# Patient Record
Sex: MALE | Race: WHITE
[De-identification: names, ages, dates, MRNs, and addresses within clinical notes are randomized per-mention and may not be internally consistent; named-entity substitution may affect disease eponyms.]

---

## 2017-05-21 ENCOUNTER — HOSPITAL ENCOUNTER (EMERGENCY)
Dept: HOSPITAL 47 - EC | Age: 22
Discharge: HOME | End: 2017-05-21
Payer: COMMERCIAL

## 2017-05-21 VITALS — TEMPERATURE: 97.7 F | RESPIRATION RATE: 16 BRPM

## 2017-05-21 VITALS — SYSTOLIC BLOOD PRESSURE: 104 MMHG | HEART RATE: 91 BPM | DIASTOLIC BLOOD PRESSURE: 56 MMHG

## 2017-05-21 DIAGNOSIS — R11.2: Primary | ICD-10-CM

## 2017-05-21 DIAGNOSIS — R10.9: ICD-10-CM

## 2017-05-21 LAB
ALP SERPL-CCNC: 49 U/L (ref 38–126)
ALT SERPL-CCNC: 47 U/L (ref 21–72)
AMYLASE SERPL-CCNC: 61 U/L (ref 30–110)
ANION GAP SERPL CALC-SCNC: 18 MMOL/L
AST SERPL-CCNC: 30 U/L (ref 17–59)
BASOPHILS # BLD AUTO: 0.1 K/UL (ref 0–0.2)
BASOPHILS NFR BLD AUTO: 1 %
BUN SERPL-SCNC: 27 MG/DL (ref 9–20)
CALCIUM SPEC-MCNC: 10.1 MG/DL (ref 8.4–10.2)
CH: 31.2
CHCM: 35.2
CHLORIDE SERPL-SCNC: 104 MMOL/L (ref 98–107)
CO2 SERPL-SCNC: 22 MMOL/L (ref 22–30)
EOSINOPHIL # BLD AUTO: 0.1 K/UL (ref 0–0.7)
EOSINOPHIL NFR BLD AUTO: 1 %
ERYTHROCYTE [DISTWIDTH] IN BLOOD BY AUTOMATED COUNT: 5.85 M/UL (ref 4.3–5.9)
ERYTHROCYTE [DISTWIDTH] IN BLOOD: 12.3 % (ref 11.5–15.5)
GLUCOSE SERPL-MCNC: 73 MG/DL (ref 74–99)
HCT VFR BLD AUTO: 52 % (ref 39–53)
HDW: 2.57
HGB BLD-MCNC: 17.5 GM/DL (ref 13–17.5)
LUC NFR BLD AUTO: 1 %
LYMPHOCYTES # SPEC AUTO: 1.5 K/UL (ref 1–4.8)
LYMPHOCYTES NFR SPEC AUTO: 12 %
MCH RBC QN AUTO: 29.9 PG (ref 25–35)
MCHC RBC AUTO-ENTMCNC: 33.6 G/DL (ref 31–37)
MCV RBC AUTO: 88.9 FL (ref 80–100)
MONOCYTES # BLD AUTO: 0.7 K/UL (ref 0–1)
MONOCYTES NFR BLD AUTO: 6 %
NEUTROPHILS # BLD AUTO: 9.9 K/UL (ref 1.3–7.7)
NEUTROPHILS NFR BLD AUTO: 80 %
NON-AFRICAN AMERICAN GFR(MDRD): >60
POTASSIUM SERPL-SCNC: 4.5 MMOL/L (ref 3.5–5.1)
PROT SERPL-MCNC: 7.9 G/DL (ref 6.3–8.2)
SODIUM SERPL-SCNC: 144 MMOL/L (ref 137–145)
WBC # BLD AUTO: 0.17 10*3/UL
WBC # BLD AUTO: 12.5 K/UL (ref 3.8–10.6)
WBC (PEROX): 12.49

## 2017-05-21 PROCEDURE — 83690 ASSAY OF LIPASE: CPT

## 2017-05-21 PROCEDURE — 96374 THER/PROPH/DIAG INJ IV PUSH: CPT

## 2017-05-21 PROCEDURE — 82150 ASSAY OF AMYLASE: CPT

## 2017-05-21 PROCEDURE — 99284 EMERGENCY DEPT VISIT MOD MDM: CPT

## 2017-05-21 PROCEDURE — 96375 TX/PRO/DX INJ NEW DRUG ADDON: CPT

## 2017-05-21 PROCEDURE — 96361 HYDRATE IV INFUSION ADD-ON: CPT

## 2017-05-21 PROCEDURE — 85025 COMPLETE CBC W/AUTO DIFF WBC: CPT

## 2017-05-21 PROCEDURE — 80053 COMPREHEN METABOLIC PANEL: CPT

## 2017-05-21 PROCEDURE — 74020: CPT

## 2017-05-21 PROCEDURE — 36415 COLL VENOUS BLD VENIPUNCTURE: CPT

## 2017-05-21 NOTE — ED
Abdominal Pain HPI





- General


Chief Complaint: Abdominal Pain


Stated Complaint: Vomiting


Time Seen by Provider: 05/21/17 15:40


Source: patient, RN notes reviewed


Mode of arrival: ambulatory


Limitations: no limitations





- History of Present Illness


Initial Comments: 





21-year-old male presents to the emergency Department chief complaint of nausea 

and vomiting.  Patient states she's been having nausea and vomiting 5:00 in the 

morning.  Patient states that it having hiccups he vomits.  Patient states that 

he is having abdominal pain with this.  Patient states it history acid reflux 

but states that this normally goes away but is not going away today.  Patient 

denies any fever chills he denies any abdominal pain changes in bowel or 

bladder habits.  Patient states she had the sharp.  It is not patient water but 

is unable to keep any of it down.  Patient states she was concerned due to his 

continued symptoms so he thought that he should be evaluated.Patient denies any 

recent fever, chills, shortness of breath, chest pain, back pain, abdominal pain

, numbness or tingling, dysuria or hematuria, constipation or diarrhea, 

headaches or visual changes, or any other current symptoms.





- Related Data


 Home Medications











 Medication  Instructions  Recorded  Confirmed


 


Bismuth Subsalicylate 524 mg PO DAILY PRN 05/21/17 05/21/17





[Pepto-Bismol]   








 Previous Rx's











 Medication  Instructions  Recorded


 


Ondansetron Odt [Zofran ODT] 4 mg PO Q8HR PRN #20 tab 05/21/17











 Allergies











Allergy/AdvReac Type Severity Reaction Status Date / Time


 


No Known Allergies Allergy   Verified 05/21/17 15:43














Review of Systems


ROS Statement: 


Those systems with pertinent positive or pertinent negative responses have been 

documented in the HPI.





ROS Other: All systems not noted in ROS Statement are negative.





Past Medical History


Past Medical History: GERD/Reflux


History of Any Multi-Drug Resistant Organisms: MRSA


Date of last positivie culture/infection: 2009


Past Surgical History: Orthopedic Surgery, Tonsillectomy


Additional Past Surgical History / Comment(s): nose


Past Psychological History: ADD/ADHD


Smoking Status: Never smoker


Past Alcohol Use History: Occasional


Past Drug Use History: None Reported





General Exam





- General Exam Comments


Initial Comments: 





General:  The patient is awake and alert, in no distress, and does not appear 

acutely ill. 


Eye:  Pupils are equal, round.


Ears, nose, mouth and throat:  There are moist mucous membranes.


Neck:  The neck is supple, there is no tenderness.


Cardiovascular:  There is a regular rate and rhythm. No murmur, rub or gallop 

is appreciated.


Respiratory:  Lungs are clear to auscultation, respirations are non-labored, 

breath sounds are equal.  No wheezes, stridor, rales, or rhonchi.


Gastrointestinal:  Soft, non-distended, non-tender abdomen without masses or 

organomegaly noted. There is no rebound or guarding present.  No CVA 

tenderness. Bowel sounds are unremarkable.


Back:  There is no tenderness to palpation in the midline. There is no obvious 

deformity. No rashes noted. 


Musculoskeletal:  Normal ROM, no tenderness, There is no pedal edema. There is 

no calf tenderness or swelling. Sensation intact. Pulses equal bilaterally 2+.  


Neurological:  CN II-XII intact, There are no obvious motor or sensory 

deficits. Coordination appears grossly intact. Speech is normal.


Skin:  Skin is warm and dry and no rashes or lesions are noted. 


Psychiatric:  Cooperative, appropriate mood & affect, normal judgment.  





Limitations: no limitations





Course


 Vital Signs











  05/21/17





  15:13


 


Temperature 97.7 F


 


Pulse Rate 98


 


Respiratory 16





Rate 


 


Blood Pressure 127/76


 


O2 Sat by Pulse 96





Oximetry 














Medical Decision Making





- Medical Decision Making





21-year-old male presents to the emergency department with a chief complaint of 

nausea and vomiting.  At this time patient's nausea vomiting has improved.  We 

will continue the patient's IV fluids and give him a bolus.  We'll give her 

nausea meds for home.  We discussed care follow-up return parameters all patient

's questions.  He stated he understood his plan.  He will be discharged home.





- Lab Data


Result diagrams: 


 05/21/17 16:00





 05/21/17 16:00


 Lab Results











  05/21/17 05/21/17 Range/Units





  16:00 16:00 


 


WBC   12.5 H  (3.8-10.6)  k/uL


 


RBC   5.85  (4.30-5.90)  m/uL


 


Hgb   17.5  (13.0-17.5)  gm/dL


 


Hct   52.0  (39.0-53.0)  %


 


MCV   88.9  (80.0-100.0)  fL


 


MCH   29.9  (25.0-35.0)  pg


 


MCHC   33.6  (31.0-37.0)  g/dL


 


RDW   12.3  (11.5-15.5)  %


 


Plt Count   260  (150-450)  k/uL


 


Neutrophils %   80  %


 


Lymphocytes %   12  %


 


Monocytes %   6  %


 


Eosinophils %   1  %


 


Basophils %   1  %


 


Neutrophils #   9.9 H  (1.3-7.7)  k/uL


 


Lymphocytes #   1.5  (1.0-4.8)  k/uL


 


Monocytes #   0.7  (0-1.0)  k/uL


 


Eosinophils #   0.1  (0-0.7)  k/uL


 


Basophils #   0.1  (0-0.2)  k/uL


 


Sodium  144   (137-145)  mmol/L


 


Potassium  4.5   (3.5-5.1)  mmol/L


 


Chloride  104   ()  mmol/L


 


Carbon Dioxide  22   (22-30)  mmol/L


 


Anion Gap  18   mmol/L


 


BUN  27 H   (9-20)  mg/dL


 


Creatinine  0.96   (0.66-1.25)  mg/dL


 


Est GFR (MDRD) Af Amer  >60   (>60 ml/min/1.73 sqM)  


 


Est GFR (MDRD) Non-Af  >60   (>60 ml/min/1.73 sqM)  


 


Glucose  73 L   (74-99)  mg/dL


 


Calcium  10.1   (8.4-10.2)  mg/dL


 


Total Bilirubin  2.3 H   (0.2-1.3)  mg/dL


 


AST  30   (17-59)  U/L


 


ALT  47   (21-72)  U/L


 


Alkaline Phosphatase  49   ()  U/L


 


Total Protein  7.9   (6.3-8.2)  g/dL


 


Albumin  5.1 H   (3.5-5.0)  g/dL


 


Amylase  61   ()  U/L


 


Lipase  28   ()  U/L














- Radiology Data


Radiology results: report reviewed, image reviewed





Disposition


Clinical Impression: 


 Nausea & vomiting





Disposition: HOME SELF-CARE


Condition: Stable


Instructions:  Acute Nausea and Vomiting (ED)


Additional Instructions: 


Please use medication as discussed. Please follow up with family doctor if 

symptoms have not improved over the next two days. Please return to the 

emergency room if your symptoms increase or worsen or for any other concerns. 


Prescriptions: 


Ondansetron Odt [Zofran ODT] 4 mg PO Q8HR PRN #20 tab


 PRN Reason: Nausea


Referrals: 


Keyonna Cali MD [STAFF PHYSICIAN] - 1-2 days


Time of Disposition: 16:48

## 2017-05-21 NOTE — XR
EXAMINATION TYPE: XR abdomen 2V

 

DATE OF EXAM: 5/21/2017 4:11 PM

 

COMPARISON: NONE

 

INDICATION: Pain

 

TECHNIQUE: Abdomen in the upright and supine views.

 

FINDINGS:  

There is a normal bowel gas pattern. No free air is present. No differential air-fluid levels are pre
sent.

Psoas margins are normal.

No organomegaly is present.

Normal colonic bowel gas is present. Some fecal debris is within the colon. No suspicious calcificati
ons are evident.

 

IMPRESSION: 

1. Unremarkable Abdomen

## 2021-09-24 ENCOUNTER — HOSPITAL ENCOUNTER (INPATIENT)
Dept: HOSPITAL 47 - EC | Age: 26
LOS: 1 days | Discharge: HOME | DRG: 381 | End: 2021-09-25
Attending: INTERNAL MEDICINE | Admitting: INTERNAL MEDICINE
Payer: MEDICARE

## 2021-09-24 ENCOUNTER — HOSPITAL ENCOUNTER (EMERGENCY)
Dept: HOSPITAL 47 - EC | Age: 26
Discharge: HOME | End: 2021-09-24
Payer: MEDICARE

## 2021-09-24 VITALS — RESPIRATION RATE: 18 BRPM | HEART RATE: 77 BPM | TEMPERATURE: 98 F

## 2021-09-24 VITALS — SYSTOLIC BLOOD PRESSURE: 143 MMHG | DIASTOLIC BLOOD PRESSURE: 80 MMHG

## 2021-09-24 DIAGNOSIS — Z20.822: ICD-10-CM

## 2021-09-24 DIAGNOSIS — Z79.899: ICD-10-CM

## 2021-09-24 DIAGNOSIS — K21.9: Primary | ICD-10-CM

## 2021-09-24 DIAGNOSIS — R17: ICD-10-CM

## 2021-09-24 DIAGNOSIS — K21.00: ICD-10-CM

## 2021-09-24 DIAGNOSIS — K44.9: ICD-10-CM

## 2021-09-24 DIAGNOSIS — D72.829: ICD-10-CM

## 2021-09-24 DIAGNOSIS — R11.2: ICD-10-CM

## 2021-09-24 DIAGNOSIS — K22.11: Primary | ICD-10-CM

## 2021-09-24 DIAGNOSIS — Z86.14: ICD-10-CM

## 2021-09-24 DIAGNOSIS — F90.9: ICD-10-CM

## 2021-09-24 LAB
ALBUMIN SERPL-MCNC: 5.1 G/DL (ref 3.5–5)
ALBUMIN SERPL-MCNC: 5.4 G/DL (ref 3.5–5)
ALP SERPL-CCNC: 53 U/L (ref 38–126)
ALP SERPL-CCNC: 54 U/L (ref 38–126)
ALT SERPL-CCNC: 19 U/L (ref 4–49)
ALT SERPL-CCNC: 20 U/L (ref 4–49)
AMYLASE SERPL-CCNC: 47 U/L (ref 30–110)
ANION GAP SERPL CALC-SCNC: 15 MMOL/L
ANION GAP SERPL CALC-SCNC: 18 MMOL/L
APTT BLD: 22.1 SEC (ref 22–30)
AST SERPL-CCNC: 25 U/L (ref 17–59)
AST SERPL-CCNC: 27 U/L (ref 17–59)
BASOPHILS # BLD AUTO: 0 K/UL (ref 0–0.2)
BASOPHILS # BLD AUTO: 0.1 K/UL (ref 0–0.2)
BASOPHILS NFR BLD AUTO: 0 %
BASOPHILS NFR BLD AUTO: 1 %
BUN SERPL-SCNC: 25 MG/DL (ref 9–20)
BUN SERPL-SCNC: 27 MG/DL (ref 9–20)
CALCIUM SPEC-MCNC: 10.3 MG/DL (ref 8.4–10.2)
CALCIUM SPEC-MCNC: 10.7 MG/DL (ref 8.4–10.2)
CHLORIDE SERPL-SCNC: 101 MMOL/L (ref 98–107)
CHLORIDE SERPL-SCNC: 103 MMOL/L (ref 98–107)
CO2 SERPL-SCNC: 21 MMOL/L (ref 22–30)
CO2 SERPL-SCNC: 23 MMOL/L (ref 22–30)
EOSINOPHIL # BLD AUTO: 0 K/UL (ref 0–0.7)
EOSINOPHIL # BLD AUTO: 0.1 K/UL (ref 0–0.7)
EOSINOPHIL NFR BLD AUTO: 0 %
EOSINOPHIL NFR BLD AUTO: 1 %
ERYTHROCYTE [DISTWIDTH] IN BLOOD BY AUTOMATED COUNT: 5.71 M/UL (ref 4.3–5.9)
ERYTHROCYTE [DISTWIDTH] IN BLOOD BY AUTOMATED COUNT: 5.73 M/UL (ref 4.3–5.9)
ERYTHROCYTE [DISTWIDTH] IN BLOOD: 11.8 % (ref 11.5–15.5)
ERYTHROCYTE [DISTWIDTH] IN BLOOD: 12.4 % (ref 11.5–15.5)
GLUCOSE SERPL-MCNC: 122 MG/DL (ref 74–99)
GLUCOSE SERPL-MCNC: 90 MG/DL (ref 74–99)
HCT VFR BLD AUTO: 49.9 % (ref 39–53)
HCT VFR BLD AUTO: 51.1 % (ref 39–53)
HGB BLD-MCNC: 17.6 GM/DL (ref 13–17.5)
HGB BLD-MCNC: 17.9 GM/DL (ref 13–17.5)
INR PPP: 1.1 (ref ?–1.2)
KETONES UR QL STRIP.AUTO: (no result)
LIPASE SERPL-CCNC: 26 U/L (ref 23–300)
LIPASE SERPL-CCNC: 28 U/L (ref 23–300)
LYMPHOCYTES # SPEC AUTO: 1.2 K/UL (ref 1–4.8)
LYMPHOCYTES # SPEC AUTO: 1.5 K/UL (ref 1–4.8)
LYMPHOCYTES NFR SPEC AUTO: 12 %
LYMPHOCYTES NFR SPEC AUTO: 7 %
MCH RBC QN AUTO: 30.7 PG (ref 25–35)
MCH RBC QN AUTO: 31.3 PG (ref 25–35)
MCHC RBC AUTO-ENTMCNC: 35.1 G/DL (ref 31–37)
MCHC RBC AUTO-ENTMCNC: 35.2 G/DL (ref 31–37)
MCV RBC AUTO: 87.3 FL (ref 80–100)
MCV RBC AUTO: 89.2 FL (ref 80–100)
MONOCYTES # BLD AUTO: 0.5 K/UL (ref 0–1)
MONOCYTES # BLD AUTO: 0.7 K/UL (ref 0–1)
MONOCYTES NFR BLD AUTO: 4 %
MONOCYTES NFR BLD AUTO: 4 %
NEUTROPHILS # BLD AUTO: 15.8 K/UL (ref 1.3–7.7)
NEUTROPHILS # BLD AUTO: 9.8 K/UL (ref 1.3–7.7)
NEUTROPHILS NFR BLD AUTO: 81 %
NEUTROPHILS NFR BLD AUTO: 89 %
PH UR: 5.5 [PH] (ref 5–8)
PLATELET # BLD AUTO: 287 K/UL (ref 150–450)
PLATELET # BLD AUTO: 299 K/UL (ref 150–450)
POTASSIUM SERPL-SCNC: 4.3 MMOL/L (ref 3.5–5.1)
POTASSIUM SERPL-SCNC: 4.3 MMOL/L (ref 3.5–5.1)
PROT SERPL-MCNC: 7.8 G/DL (ref 6.3–8.2)
PROT SERPL-MCNC: 8.6 G/DL (ref 6.3–8.2)
PT BLD: 11.6 SEC (ref 9–12)
SODIUM SERPL-SCNC: 140 MMOL/L (ref 137–145)
SODIUM SERPL-SCNC: 141 MMOL/L (ref 137–145)
SP GR UR: 1.03 (ref 1–1.03)
UROBILINOGEN UR QL STRIP: <2 MG/DL (ref ?–2)
WBC # BLD AUTO: 12.1 K/UL (ref 3.8–10.6)
WBC # BLD AUTO: 17.9 K/UL (ref 3.8–10.6)

## 2021-09-24 PROCEDURE — 83735 ASSAY OF MAGNESIUM: CPT

## 2021-09-24 PROCEDURE — 96374 THER/PROPH/DIAG INJ IV PUSH: CPT

## 2021-09-24 PROCEDURE — 86901 BLOOD TYPING SEROLOGIC RH(D): CPT

## 2021-09-24 PROCEDURE — 43235 EGD DIAGNOSTIC BRUSH WASH: CPT

## 2021-09-24 PROCEDURE — 81003 URINALYSIS AUTO W/O SCOPE: CPT

## 2021-09-24 PROCEDURE — 85025 COMPLETE CBC W/AUTO DIFF WBC: CPT

## 2021-09-24 PROCEDURE — 82150 ASSAY OF AMYLASE: CPT

## 2021-09-24 PROCEDURE — 99285 EMERGENCY DEPT VISIT HI MDM: CPT

## 2021-09-24 PROCEDURE — 86850 RBC ANTIBODY SCREEN: CPT

## 2021-09-24 PROCEDURE — 83690 ASSAY OF LIPASE: CPT

## 2021-09-24 PROCEDURE — 96375 TX/PRO/DX INJ NEW DRUG ADDON: CPT

## 2021-09-24 PROCEDURE — 80306 DRUG TEST PRSMV INSTRMNT: CPT

## 2021-09-24 PROCEDURE — 36415 COLL VENOUS BLD VENIPUNCTURE: CPT

## 2021-09-24 PROCEDURE — 80048 BASIC METABOLIC PNL TOTAL CA: CPT

## 2021-09-24 PROCEDURE — 85610 PROTHROMBIN TIME: CPT

## 2021-09-24 PROCEDURE — 85730 THROMBOPLASTIN TIME PARTIAL: CPT

## 2021-09-24 PROCEDURE — 0DJ08ZZ INSPECTION OF UPPER INTESTINAL TRACT, VIA NATURAL OR ARTIFICIAL OPENING ENDOSCOPIC: ICD-10-PCS

## 2021-09-24 PROCEDURE — 80076 HEPATIC FUNCTION PANEL: CPT

## 2021-09-24 PROCEDURE — 99284 EMERGENCY DEPT VISIT MOD MDM: CPT

## 2021-09-24 PROCEDURE — 93005 ELECTROCARDIOGRAM TRACING: CPT

## 2021-09-24 PROCEDURE — 84484 ASSAY OF TROPONIN QUANT: CPT

## 2021-09-24 PROCEDURE — 83605 ASSAY OF LACTIC ACID: CPT

## 2021-09-24 PROCEDURE — 96361 HYDRATE IV INFUSION ADD-ON: CPT

## 2021-09-24 PROCEDURE — 87635 SARS-COV-2 COVID-19 AMP PRB: CPT

## 2021-09-24 PROCEDURE — 86900 BLOOD TYPING SEROLOGIC ABO: CPT

## 2021-09-24 PROCEDURE — 80053 COMPREHEN METABOLIC PANEL: CPT

## 2021-09-24 PROCEDURE — 71046 X-RAY EXAM CHEST 2 VIEWS: CPT

## 2021-09-24 RX ADMIN — HYDROMORPHONE HYDROCHLORIDE PRN MG: 1 INJECTION, SOLUTION INTRAMUSCULAR; INTRAVENOUS; SUBCUTANEOUS at 15:14

## 2021-09-24 RX ADMIN — CEFAZOLIN SCH MLS: 330 INJECTION, POWDER, FOR SOLUTION INTRAMUSCULAR; INTRAVENOUS at 16:41

## 2021-09-24 RX ADMIN — HYDROMORPHONE HYDROCHLORIDE PRN MG: 1 INJECTION, SOLUTION INTRAMUSCULAR; INTRAVENOUS; SUBCUTANEOUS at 17:36

## 2021-09-24 RX ADMIN — PANTOPRAZOLE SODIUM SCH MG: 40 INJECTION, POWDER, FOR SOLUTION INTRAVENOUS at 21:21

## 2021-09-24 RX ADMIN — CEFAZOLIN SCH MLS/HR: 330 INJECTION, POWDER, FOR SOLUTION INTRAMUSCULAR; INTRAVENOUS at 17:37

## 2021-09-24 NOTE — ED
Abdominal Pain HPI





- General


Chief Complaint: Abdominal Pain


Stated Complaint: acid reflux


Time Seen by Provider: 09/24/21 08:29


Source: patient, RN notes reviewed


Mode of arrival: ambulatory


Limitations: no limitations





- History of Present Illness


Initial Comments: 





Patient is a 26 she'll male that presents to emergency department complaining of

acid reflux with nausea and vomiting.  He notes that he is having a hard time 

keeping the water at this time.  He notes that he eats spicy food drinks 

CAFFEINE.  He notes that the aggravating incident this time was be just do this 

morning.  He notices he ate the meat from us to he had instant heartburn.  P

atient denied taking any [place] medication at home.  He usually just eats Tums 

but did not get him in time.  He denied any other issues or complaints.  He was 

otherwise a well-appearing 26 she'll male.  He denied chest pain shortness of 

breath constipation diarrhea fever fatigue chills.





- Related Data


                                  Previous Rx's











 Medication  Instructions  Recorded


 


Ondansetron Odt [Zofran Odt] 4 mg PO Q8HR PRN #10 tab 09/24/21


 


Pantoprazole [Protonix] 40 mg PO DAILY 10 Days #10 tab 09/24/21











                                    Allergies











Allergy/AdvReac Type Severity Reaction Status Date / Time


 


No Known Allergies Allergy   Verified 09/24/21 09:55














Review of Systems


ROS Statement: 


Those systems with pertinent positive or pertinent negative responses have been 

documented in the HPI.





ROS Other: All systems not noted in ROS Statement are negative.





Past Medical History


Past Medical History: GERD/Reflux


History of Any Multi-Drug Resistant Organisms: MRSA


Date of last positivie culture/infection: 2009


Past Surgical History: Orthopedic Surgery, Tonsillectomy


Additional Past Surgical History / Comment(s): nose


Past Psychological History: ADD/ADHD


Smoking Status: Never smoker


Past Alcohol Use History: Occasional


Past Drug Use History: Marijuana





General Exam


Limitations: no limitations


General appearance: alert, in no apparent distress


Head exam: Present: atraumatic, normocephalic, normal inspection


Eye exam: Present: normal appearance, PERRL, EOMI.  Absent: scleral icterus, 

conjunctival injection, periorbital swelling


ENT exam: Present: normal exam, mucous membranes moist


Neck exam: Present: normal inspection


Respiratory exam: Present: normal lung sounds bilaterally.  Absent: respiratory 

distress, wheezes, rales, rhonchi, stridor


Cardiovascular Exam: Present: regular rate, normal rhythm, normal heart sounds. 

 Absent: systolic murmur, diastolic murmur, rubs, gallop, clicks


GI/Abdominal exam: Present: soft, normal bowel sounds.  Absent: distended, 

tenderness, guarding, rebound, rigid


Extremities exam: Present: normal inspection, full ROM, normal capillary refill.

  Absent: tenderness, pedal edema, joint swelling, calf tenderness


Neurological exam: Present: alert, oriented X3


Psychiatric exam: Present: normal affect, normal mood


Skin exam: Present: warm, dry, intact, normal color.  Absent: rash





Course


                                   Vital Signs











  09/24/21 09/24/21 09/24/21





  08:24 09:27 10:27


 


Temperature 98 F  


 


Pulse Rate 77  


 


Respiratory 18 18 18





Rate   


 


Blood Pressure 121/81  


 


O2 Sat by Pulse 100  





Oximetry   














Medical Decision Making





- Medical Decision Making





26 she'll male complaining of nausea and vomiting associated with acid reflux.


Basic labs, 40 mg of Protonix, GI cocktail, 4 g of Zofran ordered.


Patient unable to keep down GI cocktail, Zofran given will retry.


Labs: Mild leukocytosis at 12.5 most likely reactive from vomiting.  CMP 

unremarkable.


Case discussed with Dr. Vivar, patient discharge home with follow up primary 

care.





- Lab Data


Result diagrams: 


                                 09/24/21 08:45





                                 09/24/21 08:45


                                   Lab Results











  09/24/21 09/24/21 Range/Units





  08:45 08:45 


 


WBC  12.1 H   (3.8-10.6)  k/uL


 


RBC  5.71   (4.30-5.90)  m/uL


 


Hgb  17.6 H   (13.0-17.5)  gm/dL


 


Hct  49.9   (39.0-53.0)  %


 


MCV  87.3   (80.0-100.0)  fL


 


MCH  30.7   (25.0-35.0)  pg


 


MCHC  35.2   (31.0-37.0)  g/dL


 


RDW  12.4   (11.5-15.5)  %


 


Plt Count  299   (150-450)  k/uL


 


MPV  7.8   


 


Neutrophils %  81   %


 


Lymphocytes %  12   %


 


Monocytes %  4   %


 


Eosinophils %  1   %


 


Basophils %  1   %


 


Neutrophils #  9.8 H   (1.3-7.7)  k/uL


 


Lymphocytes #  1.5   (1.0-4.8)  k/uL


 


Monocytes #  0.5   (0-1.0)  k/uL


 


Eosinophils #  0.1   (0-0.7)  k/uL


 


Basophils #  0.1   (0-0.2)  k/uL


 


Sodium   141  (137-145)  mmol/L


 


Potassium   4.3  (3.5-5.1)  mmol/L


 


Chloride   103  ()  mmol/L


 


Carbon Dioxide   23  (22-30)  mmol/L


 


Anion Gap   15  mmol/L


 


BUN   25 H  (9-20)  mg/dL


 


Creatinine   0.95  (0.66-1.25)  mg/dL


 


Est GFR (CKD-EPI)AfAm   >90  (>60 ml/min/1.73 sqM)  


 


Est GFR (CKD-EPI)NonAf   >90  (>60 ml/min/1.73 sqM)  


 


Glucose   90  (74-99)  mg/dL


 


Calcium   10.3 H  (8.4-10.2)  mg/dL


 


Total Bilirubin   2.6 H  (0.2-1.3)  mg/dL


 


AST   25  (17-59)  U/L


 


ALT   19  (4-49)  U/L


 


Alkaline Phosphatase   53  ()  U/L


 


Total Protein   7.8  (6.3-8.2)  g/dL


 


Albumin   5.1 H  (3.5-5.0)  g/dL


 


Amylase   47  ()  U/L


 


Lipase   28  ()  U/L














Disposition


Clinical Impression: 


 Gastroesophageal reflux, Nausea & vomiting





Disposition: HOME SELF-CARE


Condition: Stable


Instructions (If sedation given, give patient instructions):  Gastroesophageal 

Reflux Disease (ED)


Additional Instructions: 


Please return to the Emergency Department if symptoms worsen or any other 

concerns.


Follow-up with primary care 1-2 days.


Take medication as prescribed.


Eat a bland diet.





Prescriptions: 


Pantoprazole [Protonix] 40 mg PO DAILY 10 Days #10 tab


Ondansetron Odt [Zofran Odt] 4 mg PO Q8HR PRN #10 tab


 PRN Reason: Nausea


Is patient prescribed a controlled substance at d/c from ED?: No


Referrals: 


None,Stated [Primary Care Provider] - 1-2 days


Time of Disposition: 10:40

## 2021-09-24 NOTE — P.CONS
History of Present Illness





- Reason for Consult


Consult date: 09/24/21


Hematemesis


Requesting physician: Madalyn Hamilton





- Chief Complaint


Epigastric pain, hematemesis





- History of Present Illness





This is a 26-year-old white male who presented to the emergency department 

earlier this morning with complaints of acid reflux with nausea and vomiting he 

was given GI cocktail and sent home.  He returned this afternoon with complaints

of continued epigastric pain with nausea, vomiting and hematemesis.  States he 

started vomiting bright red blood at home as well as here in the emergency 

department.  He states he does have a history of acid reflux since high school 

with frequent nausea and vomiting.  He does not take any medications for his ac

id reflux and has not seen a PCP or specialist for the chronic nausea and 

vomiting.  He denies any previous history of peptic ulcer disease or previous 

EGD or colonoscopy.  He denies any alcohol abuse, he is a nonsmoker, however he 

does smoke marijuana regularly.  He has a past medical history of anxiety, 

depression, and ADHD for which he is not on any medications currently.  The 

patient states he's been vomiting pretty much all day for the last 3 days 

duration since Wednesday.  However this the first episode of vomiting blood.  On

presentation WBC 17.9 hemoglobin 17.9 hematocrit 51 platelet count 287,000 INR 

1.1 sodium 140 potassium 4.3 BUN 27 creatinine 1.0 glucose 122 plasma lactic 

acid 2.7 total bilirubin 3.0 AST 27 ALT 20 alkaline phosphatase 54 lipase 26.  

Patient states he has had nothing to eat or drink today, states he actually 

vomited the GI cocktails that they gave to him in the emergency department.





Review of Systems





REVIEW OF SYSTEMS:


CARDIOPULMONARY: No chest pain or shortness of breath.  


Gastrointestinal: Acid reflux with severe epigastric pain.  Nausea and vomiting 

3 days.  Multiple episodes.  Hematemesis.  No rectal bleeding, or melena.


GENITOURINARY:  No dysuria or hematuria. 


MUSCULOSKELETAL: Reports normal range of motion.,  Joint pain.


SKIN: No rashes.  No jaundice.


ENDOCRINE: No chills, fevers.  No excessive weight gain or loss.  No polydipsia 

or polyuria.


PSYCHIATRIC: Unremarkable. 


NEUROLOGY: No change in mental status.  Denies dizziness, headache.


ENT: Vision unremarkable. 


CONSTITUTIONAL: No recent weight loss. No fever, chills, night sweats. 





Past Medical History


Past Medical History: GERD/Reflux


History of Any Multi-Drug Resistant Organisms: MRSA


Year Discovered:: 2009


Past Surgical History: Orthopedic Surgery, Tonsillectomy


Additional Past Surgical History / Comment(s): nose


Past Psychological History: ADD/ADHD


Smoking Status: Never smoker


Past Alcohol Use History: Occasional


Past Drug Use History: Marijuana





Medications and Allergies


                                Home Medications











 Medication  Instructions  Recorded  Confirmed  Type


 


Ondansetron Odt [Zofran Odt] 4 mg PO Q8HR PRN #10 tab 09/24/21  Rx


 


Pantoprazole [Protonix] 40 mg PO DAILY 10 Days #10 tab 09/24/21  Rx








                                    Allergies











Allergy/AdvReac Type Severity Reaction Status Date / Time


 


No Known Allergies Allergy   Verified 09/24/21 14:58














Physical Exam


Vitals: 


                                   Vital Signs











  Temp Pulse Resp BP Pulse Ox


 


 09/24/21 14:31   84  16  136/79  99


 


 09/24/21 13:03  98.0 F  68  20  131/77  97








                                Intake and Output











 09/23/21 09/24/21 09/24/21





 22:59 06:59 14:59


 


Other:   


 


  Weight   65.771 kg














General appearance: The patient is alert, oriented, appears in no acute 

distress.


HET: Head is normocephalic and atraumatic.  Conjunctiva pink.  Sclera anicteric.


Neck: Supple without lymphadenopathy.  Trachea midline.


Heart: S1 S2.  Regular rate and rhythm.


Lungs: Clear to auscultation.


Abdomen: Soft, epigastric tenderness, nondistended with  bowel sounds.  No 

guarding or rigidity.


Skin:  No rashes. No jaundice.


Extremities: Normal skin color and turgor.  No pedal edema.


Neurological: No focal deficits.  Alert and oriented 3..





Results


CBC & Chem 7: 


                                 09/24/21 13:45





                                 09/24/21 13:45


Labs: 


                  Abnormal Lab Results - Last 24 Hours (Table)











  09/24/21 09/24/21 09/24/21 Range/Units





  13:45 13:45 13:45 


 


WBC  17.9 H    (3.8-10.6)  k/uL


 


Hgb  17.9 H    (13.0-17.5)  gm/dL


 


Neutrophils #  15.8 H    (1.3-7.7)  k/uL


 


Carbon Dioxide   21 L   (22-30)  mmol/L


 


BUN   27 H   (9-20)  mg/dL


 


Glucose   122 H   (74-99)  mg/dL


 


Plasma Lactic Acid Aidan    2.7 H*  (0.7-2.0)  mmol/L


 


Calcium   10.7 H   (8.4-10.2)  mg/dL


 


Total Bilirubin   3.0 H   (0.2-1.3)  mg/dL


 


Total Protein   8.6 H   (6.3-8.2)  g/dL


 


Albumin   5.4 H   (3.5-5.0)  g/dL














Assessment and Plan


(1) Hematemesis


Narrative/Plan: 


26-year-old male with a past medical history of GERD, anxiety, depression, ADHD 

and marijuana use currently not on any medications presented to the emergency 

department for the second time today with complaints of acid reflux with nausea 

and vomiting.  He was seen earlier today and given a GI cocktail and sent home. 

While he was at home he continued to have nausea and vomiting and eventually 

started vomiting bright red blood.  Patient states he has severe epigastric pain

associated with the nausea and vomiting.  He's had symptoms since high school 

with nausea and vomiting and acid reflux however has not seen a PCP or 

specialist and is currently not on any medications.  Patient denies any history 

of alcohol abuse or previous peptic ulcer disease.  He has not had a previous GI

bleed, he denies any EGD or colonoscopy in the past.  Possible etiologies 

include peptic ulcer disease, gastritis, esophagitis, Kandy-Llamas tear or 

other etiologies.  Will proceed with EGD.


Current Visit: Yes   Status: Acute   Code(s): K92.0 - HEMATEMESIS   SNOMED 

Code(s): 8409865


   





(2) Nausea & vomiting


Narrative/Plan: 


Patient with history of cyclic nausea and vomiting, this may be related to 

marijuana use.


Current Visit: Yes   Status: Acute   Code(s): R11.2 - NAUSEA WITH VOMITING, 

UNSPECIFIED   SNOMED Code(s): 32127874


   





(3) Epigastric pain


Current Visit: Yes   Status: Acute   Code(s): R10.13 - EPIGASTRIC PAIN   SNOMED 

Code(s): 38265091


   





(4) Gastroesophageal reflux


Current Visit: Yes   Status: Acute   Code(s): K21.9 - GASTRO-ESOPHAGEAL REFLUX 

DISEASE WITHOUT ESOPHAGITIS   SNOMED Code(s): 507993612


   


Plan: 





1.  Keep nothing by mouth


2.  Protonix 40 mg twice a day


3.  Antiemetics as needed


4.  Repeat CBC in the morning, transfuse for hemoglobin less than 7


5.  Patient scheduled for EGD this afternoon, procedure discussed in detail with

patient and his mother who is at the bedside including risks and benefits.  

Patient is willing to proceed


6.  Marijuana abstinence





Thank you for allowing us to participate in the care of the patient, the GI 

service will sign off, gastroenterology will not be available at the hospital 

this weekend and through next week.  If further evaluation by gastroenterology 

is required the patient will need transfer as per the primary team's discretion.







Dr. ERIN Walsh


I agree with the dictator's note, documented as a scribe by Kourtney KUMAR.

## 2021-09-24 NOTE — XR
EXAMINATION TYPE: XR chest 2V

 

DATE OF EXAM: 9/24/2021

 

COMPARISON: NONE

 

HISTORY: Hematemesis

 

TECHNIQUE:  Frontal and lateral views of the chest are obtained.

 

FINDINGS:  There is no focal air space opacity, pleural effusion, or pneumothorax seen.  The cardiac 
silhouette size is within normal limits.   The osseous structures are intact.

 

IMPRESSION:  No acute cardiopulmonary process.

## 2021-09-24 NOTE — ED
Abdominal Pain HPI





- General


Chief Complaint: Abdominal Pain


Stated Complaint: Revisit/Vomiting Blood


Source: patient


Mode of arrival: ambulatory


Limitations: no limitations





- History of Present Illness


Initial Comments: 





Patient is a 26-year-old previously healthy male who presents emergency room 

with nausea, vomiting, epigastric pain with vomiting of bright red blood.  

Patient was seen in emergency room for similar complaints earlier this morning. 

He reports that he frequently has reflux.  He will have globus sensations with 

inability to swallow.  He states he will take Tums and the symptoms will 

improve.  This is the first time he had a come to the emergency room for his 

complaint.  He was seen earlier.  Laboratory studies were completed.  He 

received a GI cocktail and Protonix.  He states he felt much better was 

discharged home.  She reports that as soon as he got home he began having 

hematemesis.  Denies history of similar in the past.  No NSAID or alcohol use.  

No fevers or chills.  He denies hemoptysis.  Patient on any blood thinners.  No 

history of clotting disorders.  Patient has never had an EGD.  No other 

alleviating, precipitating or modifying factors





- Related Data


                                  Previous Rx's











 Medication  Instructions  Recorded


 


Ondansetron Odt [Zofran Odt] 4 mg PO Q8HR PRN #10 tab 09/24/21


 


Pantoprazole [Protonix] 40 mg PO DAILY 10 Days #10 tab 09/24/21











                                    Allergies











Allergy/AdvReac Type Severity Reaction Status Date / Time


 


No Known Allergies Allergy   Verified 09/24/21 14:58














Review of Systems


ROS Statement: 


Those systems with pertinent positive or pertinent negative responses have been 

documented in the HPI.





ROS Other: All systems not noted in ROS Statement are negative.





Past Medical History


Past Medical History: GERD/Reflux


History of Any Multi-Drug Resistant Organisms: MRSA


Date of last positivie culture/infection: 2009


Past Surgical History: Orthopedic Surgery, Tonsillectomy


Additional Past Surgical History / Comment(s): nose


Past Psychological History: ADD/ADHD


Smoking Status: Never smoker


Past Alcohol Use History: Occasional


Past Drug Use History: Marijuana





General Exam


Limitations: no limitations


General appearance: alert, in no apparent distress


Head exam: Present: atraumatic, normocephalic, normal inspection


Eye exam: Present: normal appearance, PERRL, EOMI.  Absent: scleral icterus, 

conjunctival injection, periorbital swelling


ENT exam: Present: normal exam, mucous membranes moist


Neck exam: Present: normal inspection.  Absent: tenderness, meningismus, 

lymphadenopathy


Respiratory exam: Present: normal lung sounds bilaterally.  Absent: respiratory 

distress, wheezes, rales, rhonchi, stridor


Cardiovascular Exam: Present: regular rate, normal rhythm, normal heart sounds. 

 Absent: systolic murmur, diastolic murmur, rubs, gallop, clicks


GI/Abdominal exam: Present: soft, tenderness (epigastric), normal bowel sounds. 

 Absent: distended, guarding, rebound, rigid


Extremities exam: Present: normal inspection, full ROM, normal capillary refill.

  Absent: tenderness, pedal edema, joint swelling, calf tenderness


Back exam: Present: normal inspection


Neurological exam: Present: alert, oriented X3, CN II-XII intact


Psychiatric exam: Present: normal affect, normal mood


Skin exam: Present: warm, dry, intact, normal color.  Absent: rash





Course


                                   Vital Signs











  09/24/21 09/24/21





  13:03 14:31


 


Temperature 98.0 F 


 


Pulse Rate 68 84


 


Respiratory 20 16





Rate  


 


Blood Pressure 131/77 136/79


 


O2 Sat by Pulse 97 99





Oximetry  














- Reevaluation(s)


Reevaluation #1: 





09/24/21 14:24


Spoke with Dr. Walsh - patient going for EGD





Medical Decision Making





- Medical Decision Making





Arrival patient's placed into room 23.  There are history of physical exam is 

performed.  IV is established.  He was previously given 40 mg of Protonix.  He 

is given an additional 40 mg.  He is complaining of epigastric pain.  Given 4 mg

 of Zofran and 4 mg of morphine.  Laboratory studies are repeated.  Hemoglobin 

stable at 17.9.  Lactic acid 2.7.  Patient was given a 2 L bolus of normal 

saline.  Patient continues to have vomiting of bright red blood.  I did order 10

 g of Reglan.  Called and spoke with Dr. Walsh.  Dr. Walsh's nurse practitioner 

does present to the emergency department and is scheduling patient for 

endoscopy.  He will be admitted to [facility].  Spoke with Dr. Burleson who agreed to 

that the patient.  He currently remains nothing by mouth awaiting to go for EGD





- Lab Data


Result diagrams: 


                                 09/24/21 13:45





                                 09/24/21 13:45


                                   Lab Results











  09/24/21 09/24/21 09/24/21 Range/Units





  13:45 13:45 13:45 


 


WBC  17.9 H    (3.8-10.6)  k/uL


 


RBC  5.73    (4.30-5.90)  m/uL


 


Hgb  17.9 H    (13.0-17.5)  gm/dL


 


Hct  51.1    (39.0-53.0)  %


 


MCV  89.2    (80.0-100.0)  fL


 


MCH  31.3    (25.0-35.0)  pg


 


MCHC  35.1    (31.0-37.0)  g/dL


 


RDW  11.8    (11.5-15.5)  %


 


Plt Count  287    (150-450)  k/uL


 


MPV  7.8    


 


Neutrophils %  89    %


 


Lymphocytes %  7    %


 


Monocytes %  4    %


 


Eosinophils %  0    %


 


Basophils %  0    %


 


Neutrophils #  15.8 H    (1.3-7.7)  k/uL


 


Lymphocytes #  1.2    (1.0-4.8)  k/uL


 


Monocytes #  0.7    (0-1.0)  k/uL


 


Eosinophils #  0.0    (0-0.7)  k/uL


 


Basophils #  0.0    (0-0.2)  k/uL


 


PT     (9.0-12.0)  sec


 


INR     (<1.2)  


 


APTT     (22.0-30.0)  sec


 


Sodium   140   (137-145)  mmol/L


 


Potassium   4.3   (3.5-5.1)  mmol/L


 


Chloride   101   ()  mmol/L


 


Carbon Dioxide   21 L   (22-30)  mmol/L


 


Anion Gap   18   mmol/L


 


BUN   27 H   (9-20)  mg/dL


 


Creatinine   1.00   (0.66-1.25)  mg/dL


 


Est GFR (CKD-EPI)AfAm   >90   (>60 ml/min/1.73 sqM)  


 


Est GFR (CKD-EPI)NonAf   >90   (>60 ml/min/1.73 sqM)  


 


Glucose   122 H   (74-99)  mg/dL


 


Lactic Ac Sepsis Rflx     


 


Plasma Lactic Acid Aidan    2.7 H*  (0.7-2.0)  mmol/L


 


Calcium   10.7 H   (8.4-10.2)  mg/dL


 


Total Bilirubin   3.0 H   (0.2-1.3)  mg/dL


 


AST   27   (17-59)  U/L


 


ALT   20   (4-49)  U/L


 


Alkaline Phosphatase   54   ()  U/L


 


Troponin I     (0.000-0.034)  ng/mL


 


Total Protein   8.6 H   (6.3-8.2)  g/dL


 


Albumin   5.4 H   (3.5-5.0)  g/dL


 


Lipase   26   ()  U/L


 


Blood Type     


 


Blood Type Confirm     


 


Blood Type Recheck     


 


Bld Type Recheck Status     


 


Antibody Screen     


 


Spec Expiration Date     














  09/24/21 09/24/21 09/24/21 Range/Units





  13:45 13:45 14:25 


 


WBC     (3.8-10.6)  k/uL


 


RBC     (4.30-5.90)  m/uL


 


Hgb     (13.0-17.5)  gm/dL


 


Hct     (39.0-53.0)  %


 


MCV     (80.0-100.0)  fL


 


MCH     (25.0-35.0)  pg


 


MCHC     (31.0-37.0)  g/dL


 


RDW     (11.5-15.5)  %


 


Plt Count     (150-450)  k/uL


 


MPV     


 


Neutrophils %     %


 


Lymphocytes %     %


 


Monocytes %     %


 


Eosinophils %     %


 


Basophils %     %


 


Neutrophils #     (1.3-7.7)  k/uL


 


Lymphocytes #     (1.0-4.8)  k/uL


 


Monocytes #     (0-1.0)  k/uL


 


Eosinophils #     (0-0.7)  k/uL


 


Basophils #     (0-0.2)  k/uL


 


PT   11.6   (9.0-12.0)  sec


 


INR   1.1   (<1.2)  


 


APTT   22.1   (22.0-30.0)  sec


 


Sodium     (137-145)  mmol/L


 


Potassium     (3.5-5.1)  mmol/L


 


Chloride     ()  mmol/L


 


Carbon Dioxide     (22-30)  mmol/L


 


Anion Gap     mmol/L


 


BUN     (9-20)  mg/dL


 


Creatinine     (0.66-1.25)  mg/dL


 


Est GFR (CKD-EPI)AfAm     (>60 ml/min/1.73 sqM)  


 


Est GFR (CKD-EPI)NonAf     (>60 ml/min/1.73 sqM)  


 


Glucose     (74-99)  mg/dL


 


Lactic Ac Sepsis Rflx    Y  


 


Plasma Lactic Acid Aidan     (0.7-2.0)  mmol/L


 


Calcium     (8.4-10.2)  mg/dL


 


Total Bilirubin     (0.2-1.3)  mg/dL


 


AST     (17-59)  U/L


 


ALT     (4-49)  U/L


 


Alkaline Phosphatase     ()  U/L


 


Troponin I     (0.000-0.034)  ng/mL


 


Total Protein     (6.3-8.2)  g/dL


 


Albumin     (3.5-5.0)  g/dL


 


Lipase     ()  U/L


 


Blood Type  A Positive    


 


Blood Type Confirm     


 


Blood Type Recheck  No Previous Record    


 


Bld Type Recheck Status  CABO Indicated    


 


Antibody Screen  NEGATIVE    


 


Spec Expiration Date  09/27/2021 - 2345 09/24/21 09/24/21 Range/Units





  14:53 14:53 


 


WBC    (3.8-10.6)  k/uL


 


RBC    (4.30-5.90)  m/uL


 


Hgb    (13.0-17.5)  gm/dL


 


Hct    (39.0-53.0)  %


 


MCV    (80.0-100.0)  fL


 


MCH    (25.0-35.0)  pg


 


MCHC    (31.0-37.0)  g/dL


 


RDW    (11.5-15.5)  %


 


Plt Count    (150-450)  k/uL


 


MPV    


 


Neutrophils %    %


 


Lymphocytes %    %


 


Monocytes %    %


 


Eosinophils %    %


 


Basophils %    %


 


Neutrophils #    (1.3-7.7)  k/uL


 


Lymphocytes #    (1.0-4.8)  k/uL


 


Monocytes #    (0-1.0)  k/uL


 


Eosinophils #    (0-0.7)  k/uL


 


Basophils #    (0-0.2)  k/uL


 


PT    (9.0-12.0)  sec


 


INR    (<1.2)  


 


APTT    (22.0-30.0)  sec


 


Sodium    (137-145)  mmol/L


 


Potassium    (3.5-5.1)  mmol/L


 


Chloride    ()  mmol/L


 


Carbon Dioxide    (22-30)  mmol/L


 


Anion Gap    mmol/L


 


BUN    (9-20)  mg/dL


 


Creatinine    (0.66-1.25)  mg/dL


 


Est GFR (CKD-EPI)AfAm    (>60 ml/min/1.73 sqM)  


 


Est GFR (CKD-EPI)NonAf    (>60 ml/min/1.73 sqM)  


 


Glucose    (74-99)  mg/dL


 


Lactic Ac Sepsis Rflx    


 


Plasma Lactic Acid Aidan    (0.7-2.0)  mmol/L


 


Calcium    (8.4-10.2)  mg/dL


 


Total Bilirubin    (0.2-1.3)  mg/dL


 


AST    (17-59)  U/L


 


ALT    (4-49)  U/L


 


Alkaline Phosphatase    ()  U/L


 


Troponin I  <0.012   (0.000-0.034)  ng/mL


 


Total Protein    (6.3-8.2)  g/dL


 


Albumin    (3.5-5.0)  g/dL


 


Lipase    ()  U/L


 


Blood Type    


 


Blood Type Confirm   A Positive  


 


Blood Type Recheck    


 


Bld Type Recheck Status    


 


Antibody Screen    


 


Spec Expiration Date    














- EKG Data


EKG Comments: 





EKG demonstrates sinus bradycardia with ventricular rate of 59.  IL interval 

124.  Dress 80.  QTC of 403.  There are PACs.  No acute ST segment elevations





Disposition


Clinical Impression: 


 Gastroesophageal reflux, Nausea & vomiting, Hematemesis with nausea





Disposition: ADMITTED AS IP TO THIS [facility]


Condition: Serious


Is patient prescribed a controlled substance at d/c from ED?: No


Decision to Admit Reason: Admit from EC


Decision Date: 09/24/21


Decision Time: 14:50

## 2021-09-24 NOTE — P.HPIM
History of Present Illness


H&P Date: 09/24/21


Chief Complaint: Hematemesis, abdominal pain





26 year old man with acid reflux from his teenage years presented with abdominal

pain and hematemesis.  Patient says his pain started 2 days ago on wednesday 

shortly after eating beef stew.  He had acute onset of burning abdominal pain 

with radiation from his epigastrum to his chest.  He was given a GI cocktail for

symptoms and sent home on evaluation, however, today, he returned after having 2

episodes of hematemesis.  He has not had a meal since wednesday and continues to

have very severe abdominal pain in his epigastrum.  Denies fevers, chills, 

palps, syncope, lightheadedness, diarrhea, constipation, dysuria, dyschezia, 

melena, hematochezia, numbness/weakness of extremities.





In the ER, he is noted to have elevated WBC to 17.9 and Hgb to 17.9, no drop 

from earlier this AM.  otherwise, afebrile, HDS.  CXR pending.  





SoH: occasional marijuana use, ETOH use ~ 1 time per month.  Denies IV drug use.


FHx: No hx of Ulcerative Colitis or Crohn's or history of early liver cirrhosis


PSH: No history of surgeries


PMH: GERD








Gen: awake, alert


HEENT: normocephalic, atraumatic, good hearing acuity, moist mucous membranes


Resp: good air exchange, breathing comfortably with no accessory muscle use, 

CTAB, no wheezes, crackles


CVS: good distal perfusion x 4, RRR, no murmurs


GI: soft, TTP in epigastrum, no rebound, voluntary guarding


: no SPT, no CVAT, mckeon catheter not present


MSK: no pitting edema, no clubbing


Neuro: non-focal, moving all extremities








Labs and Imaging reviewed





A/P:





Hematemesis


Abdominal Pain


Elevated Bilirubin


- admit to telemetry


- GI consult for EGD


- CXR pending


- dilaudid for pain control


- protonix 40mg IV BID


- IVF


- type/screen


- LFTs in the AM





Patient is Full Code





Review of Systems





All Systems reviewed and pertinent positives and negatives noted in HPI, all 

other symptoms are negative





Past Medical History


Past Medical History: GERD/Reflux


History of Any Multi-Drug Resistant Organisms: MRSA


Date of last positivie culture/infection: 2009


Past Surgical History: Orthopedic Surgery, Tonsillectomy


Additional Past Surgical History / Comment(s): nose


Past Psychological History: ADD/ADHD


Smoking Status: Never smoker


Past Alcohol Use History: Occasional


Past Drug Use History: Marijuana





Medications and Allergies


                                Home Medications











 Medication  Instructions  Recorded  Confirmed  Type


 


Ondansetron Odt [Zofran Odt] 4 mg PO Q8HR PRN #10 tab 09/24/21 09/24/21 Rx


 


RX: Pantoprazole [Protonix] 40 mg PO DAILY 10 Days #10 tab 09/24/21 09/24/21 Rx








                                    Allergies











Allergy/AdvReac Type Severity Reaction Status Date / Time


 


No Known Allergies Allergy   Verified 09/24/21 14:58














Physical Exam


Osteopathic Statement: *.  No significant issues noted on an osteopathic struct

ural exam other than those noted in the History and Physical/Consult.


Vitals: 


                                   Vital Signs











  Temp Pulse Resp BP Pulse Ox


 


 09/24/21 14:31   84  16  136/79  99


 


 09/24/21 13:03  98.0 F  68  20  131/77  97








                                Intake and Output











 09/24/21 09/24/21 09/24/21





 06:59 14:59 22:59


 


Other:   


 


  Weight  65.771 kg 














Results


CBC & Chem 7: 


                                 09/24/21 13:45





                                 09/24/21 13:45


Labs: 


                  Abnormal Lab Results - Last 24 Hours (Table)











  09/24/21 09/24/21 09/24/21 Range/Units





  13:45 13:45 13:45 


 


WBC  17.9 H    (3.8-10.6)  k/uL


 


Hgb  17.9 H    (13.0-17.5)  gm/dL


 


Neutrophils #  15.8 H    (1.3-7.7)  k/uL


 


Carbon Dioxide   21 L   (22-30)  mmol/L


 


BUN   27 H   (9-20)  mg/dL


 


Glucose   122 H   (74-99)  mg/dL


 


Plasma Lactic Acid Aidan    2.7 H*  (0.7-2.0)  mmol/L


 


Calcium   10.7 H   (8.4-10.2)  mg/dL


 


Total Bilirubin   3.0 H   (0.2-1.3)  mg/dL


 


Total Protein   8.6 H   (6.3-8.2)  g/dL


 


Albumin   5.4 H   (3.5-5.0)  g/dL

## 2021-09-24 NOTE — P.PCN
Date of Procedure: 09/24/21


Procedure(s) Performed: 


BRIEF HISTORY: Patient is a 26-year-old, pleasant, white male admitted the 

hospital with severe heartburn for the last 3 days' duration.  He came in this 

morning and was discharged to Protonix 40 mg daily.  He went home and he threw 

up blood and had couple more episodes of hematemesis and hence is scheduled for 

an upper endoscopy to evaluate further.  Last 11 was 17 g/dL. 





PROCEDURE PERFORMED: Esophagogastroduodenoscopy.





PREOPERATIVE DIAGNOSIS: Acute upper GI bleed. 





IV sedation per anesthesia. 





PROCEDURE: After informed consent was obtained, the patient  was brought into 

the endoscopy unit. IV sedation was administered by Anesthesia under continuous 

monitoring. Initially the Olympus GIF-140 video endoscope was inserted into the 

mouth. Esophagus intubated without any difficulty. It was gradually advanced 

into the stomach and duodenum and carefully examined. The bulb and the second 

part of the duodenum appeared normal. The scope at this time was withdrawn to 

the stomach, adequately insufflated with air, and upon careful examination, 

mucosa of the antrum, body, cardia and the fundus appeared normal. The scope was

then withdrawn into the esophagus. The GE junction was located at 39 cm from the

incisors.  Small hiatal hernia noted.  There was a deep ulceration noted in the 

distal esophagus extending between 33-37 cm from the incisors with a small clot 

but no active bleeding.  Also in the distal esophagus there were erosions 

consistent with LA grade B reflux esophagitis.  The~ esophagus appeared normal 

and the patient tolerated the procedure well. 





IMPRESSION: 


1.  Deep esophageal ulcer in the distal esophagus extending from 33-37 cm from 

the incisors with a small clot but no active bleeding..


2.  Erosions in the distal esophagus consistent with LA grade B reflux 

esophagitis.





RECOMMENDATIONS: The findings of this examination were discussed with the yolanda falcon ,.  He'll be started on a clear liquid diet.  Continue Protonix 40 minute 

grams twice daily.  Monitor CBC daily.. 


26

## 2021-09-25 VITALS — HEART RATE: 79 BPM | DIASTOLIC BLOOD PRESSURE: 73 MMHG | SYSTOLIC BLOOD PRESSURE: 130 MMHG | TEMPERATURE: 98.7 F

## 2021-09-25 VITALS — RESPIRATION RATE: 18 BRPM

## 2021-09-25 LAB
ALBUMIN SERPL-MCNC: 4 G/DL (ref 3.8–4.9)
ALBUMIN/GLOB SERPL: 2.11 G/DL (ref 1.6–3.17)
ALP SERPL-CCNC: 40 U/L (ref 41–126)
ALT SERPL-CCNC: 18 U/L (ref 10–49)
ANION GAP SERPL CALC-SCNC: 10.4 MMOL/L (ref 4–12)
AST SERPL-CCNC: 21 U/L (ref 14–35)
BASOPHILS # BLD AUTO: 0 K/UL (ref 0–0.2)
BASOPHILS NFR BLD AUTO: 0 %
BILIRUB INDIRECT SERPL-MCNC: 1.3 MG/DL
BUN SERPL-SCNC: 17 MG/DL (ref 9–27)
BUN/CREAT SERPL: 17 RATIO (ref 12–20)
CALCIUM SPEC-MCNC: 8.8 MG/DL (ref 8.7–10.3)
CHLORIDE SERPL-SCNC: 105 MMOL/L (ref 96–109)
CO2 SERPL-SCNC: 24.6 MMOL/L (ref 21.6–31.8)
EOSINOPHIL # BLD AUTO: 0.1 K/UL (ref 0–0.7)
EOSINOPHIL NFR BLD AUTO: 1 %
ERYTHROCYTE [DISTWIDTH] IN BLOOD BY AUTOMATED COUNT: 4.81 M/UL (ref 4.3–5.9)
ERYTHROCYTE [DISTWIDTH] IN BLOOD: 11.9 % (ref 11.5–15.5)
GLOBULIN SER CALC-MCNC: 1.9 G/DL (ref 1.6–3.3)
GLUCOSE SERPL-MCNC: 90 MG/DL (ref 70–110)
HCT VFR BLD AUTO: 43.2 % (ref 39–53)
HGB BLD-MCNC: 14.7 GM/DL (ref 13–17.5)
LYMPHOCYTES # SPEC AUTO: 1.2 K/UL (ref 1–4.8)
LYMPHOCYTES NFR SPEC AUTO: 9 %
MAGNESIUM SPEC-SCNC: 1.8 MG/DL (ref 1.5–2.4)
MCH RBC QN AUTO: 30.6 PG (ref 25–35)
MCHC RBC AUTO-ENTMCNC: 34.1 G/DL (ref 31–37)
MCV RBC AUTO: 89.9 FL (ref 80–100)
MONOCYTES # BLD AUTO: 0.9 K/UL (ref 0–1)
MONOCYTES NFR BLD AUTO: 6 %
NEUTROPHILS # BLD AUTO: 11.2 K/UL (ref 1.3–7.7)
NEUTROPHILS NFR BLD AUTO: 83 %
PLATELET # BLD AUTO: 219 K/UL (ref 150–450)
POTASSIUM SERPL-SCNC: 4.7 MMOL/L (ref 3.5–5.5)
PROT SERPL-MCNC: 5.9 G/DL (ref 6.2–8.2)
SODIUM SERPL-SCNC: 140 MMOL/L (ref 135–145)
WBC # BLD AUTO: 13.5 K/UL (ref 3.8–10.6)

## 2021-09-25 RX ADMIN — PANTOPRAZOLE SODIUM SCH MG: 40 INJECTION, POWDER, FOR SOLUTION INTRAVENOUS at 08:38

## 2021-09-25 RX ADMIN — CEFAZOLIN SCH: 330 INJECTION, POWDER, FOR SOLUTION INTRAMUSCULAR; INTRAVENOUS at 12:03

## 2021-09-25 NOTE — P.DS
Providers


Date of admission: 


09/24/21 14:57





Expected date of discharge: 09/25/21


Attending physician: 


Matias Sevilla MD





Consults: 





                                        





09/24/21 14:51


Consult Physician Urgent 


   Consulting Provider: Mariia Walsh


   Consult Reason/Comments: acute upper gi bleed


   Do you want consulting provider notified?: Yes











Primary care physician: 


Stated None





Hospital Course: 





Hematemesis


Abdominal Pain


Elevated Bilirubin


- Admitted to telemetry.  CXR was negative for acute pathology.  Pain treated 

initially with dilaudid PRN. GI consulted for EGD, which showed Grade B eso

phagitis with deep esophageal ulcer.  Pt was treated with protonix, and slowly 

advanced from CLD to GI soft.  He was discharged with GI f/u upon tolerating a 

solid diet.  





Assessment: 





Gen: awake, alert


HEENT: normocephalic, atraumatic, good hearing acuity, moist mucous membranes


Resp: good air exchange, breathing comfortably with no accessory muscle use, 

CTAB, no wheezes, crackles


CVS: good distal perfusion x 4, RRR, no murmurs


GI: soft, TTP in epigastrum, no rebound, voluntary guarding


: no SPT, no CVAT, mckeon catheter not present


MSK: no pitting edema, no clubbing


Neuro: non-focal, moving all extremities





Patient Condition at Discharge: Good





Plan - Discharge Summary


Discharge Rx Participant: No


New Discharge Prescriptions: 


Continue


   Ondansetron Odt [Zofran ODT] 4 mg PO Q8HR PRN #10 tab


     PRN Reason: Nausea





Changed


   Pantoprazole [Protonix] 40 mg PO BID 10 Days #60 tab


Discharge Medication List





Ondansetron Odt [Zofran ODT] 4 mg PO Q8HR PRN #10 tab 09/24/21 [Rx]


Pantoprazole [Protonix] 40 mg PO BID 10 Days #60 tab 09/25/21 [Rx]








Follow up Appointment(s)/Referral(s): 


None,Stated [Primary Care Provider] - 1-2 days


Discharge Disposition: HOME SELF-CARE